# Patient Record
(demographics unavailable — no encounter records)

---

## 2025-02-12 NOTE — PHYSICAL EXAM
[No Acute Distress] : no acute distress [Normal Appearance] : normal appearance [No Neck Mass] : no neck mass [Normal Rate/Rhythm] : normal rate/rhythm [Normal S1, S2] : normal s1, s2 [No Resp Distress] : no resp distress [Clear to Auscultation Bilaterally] : clear to auscultation bilaterally [No Abnormalities] : no abnormalities [No HSM] : no hsm [Normal Gait] : normal gait [No Clubbing] : no clubbing [No Edema] : no edema [Normal Turgor] : normal turgor [No Focal Deficits] : no focal deficits [Oriented x3] : oriented x3 [Normal Affect] : normal affect [Benign] : benign

## 2025-02-12 NOTE — REVIEW OF SYSTEMS
[Diabetes] : diabetes [Fever] : no fever [Fatigue] : no fatigue [Cough] : no cough [Hemoptysis] : no hemoptysis [Sputum] : no sputum [Dyspnea] : no dyspnea [Chest Discomfort] : no chest discomfort [Edema] : no edema [Palpitations] : no palpitations [Nasal Discharge] : no nasal discharge [GERD] : no gerd [Back Pain] : no back pain [Rash] : no rash [Anemia] : no anemia [Anxiety] : no anxiety [Thyroid Problem] : no thyroid problem [Obesity] : no obesity

## 2025-02-12 NOTE — PROCEDURE
[FreeTextEntry1] : PFT 2/12/2025: Moderate obstruction.  Moderate restriction.  Moderate reduction in diffusion.  Significant movement noted after inhalation of bronchodilator.  CT chest 12/23/2024: Punctate pulmonary nodules, favoring benign process.  The largest nodule measures 5 mm in the left lower lobe.  Borderline dilated main pulmonary artery.  No pleural effusion.  No evident adenopathy.  2.2 cm left adrenal nodule.

## 2025-02-12 NOTE — DISCUSSION/SUMMARY
[FreeTextEntry1] : Impression Moderate obstructive lung disease -Noted on PFT -Relatively asymptomatic Pulmonary nodules -Small in size as noted on CT of the chest and PET/CT History of prostate cancer Dilated main pulmonary artery History of prostate cancer History of diabetes  Recommend Echocardiogram if  one has not already been done Follow up CT in June I will see him in [place]

## 2025-02-12 NOTE — HISTORY OF PRESENT ILLNESS
[Never] : never [Snoring] : snoring [TextBox_4] : He is a 69-year-old man who was referred for abnormal CT findings.  He has a history of prostate cancer for which she received radiation therapy which was completed in November 2023.  He had COVID on 1 occasion which did not require hospitalization.  He denied any history of pulmonary disease.  No complaint of cough, wheezing or shortness of breath.  He never smoked.  He has been noted to snore but denies any daytime sleepiness.  [Daytime Somnolence] : denies daytime somnolence

## 2025-06-25 NOTE — HISTORY OF PRESENT ILLNESS
[Never] : never [Snoring] : snoring [TextBox_4] : 2/12/2025: He is a 69-year-old man who was referred for abnormal CT findings.  He has a history of prostate cancer for which he received radiation therapy which was completed in November 2023.  He had COVID on 1 occasion which did not require hospitalization. He denied any history of pulmonary disease.  No complaint of cough, wheezing or shortness of breath.  He never smoked.  6/25/2025: He came for routine scheduled follow-up visit today.  No complaint of cough, wheezing or shortness of breath.  No new medical issues.  He does not smoke. [Daytime Somnolence] : denies daytime somnolence

## 2025-06-25 NOTE — PHYSICAL EXAM
[No Acute Distress] : no acute distress [Normal Appearance] : normal appearance [No Neck Mass] : no neck mass [Normal Rate/Rhythm] : normal rate/rhythm [Normal S1, S2] : normal s1, s2 [No Resp Distress] : no resp distress [Clear to Auscultation Bilaterally] : clear to auscultation bilaterally [No Abnormalities] : no abnormalities [Benign] : benign [No HSM] : no hsm [Normal Gait] : normal gait [No Clubbing] : no clubbing [No Edema] : no edema [Normal Turgor] : normal turgor [No Focal Deficits] : no focal deficits [Oriented x3] : oriented x3 [Normal Affect] : normal affect

## 2025-06-25 NOTE — PROCEDURE
[FreeTextEntry1] : PFT 2/12/2025: Moderate obstruction.  Moderate restriction.  Moderate reduction in diffusion.  Significant movement noted after inhalation of bronchodilator.  PFT 6/25/2025: Moderate obstructive lung disease.  Good response to bronchodilator.  CT chest 12/23/2024: Punctate pulmonary nodules, favoring benign process.  The largest nodule measures 5 mm in the left lower lobe.  Borderline dilated main pulmonary artery.  No pleural effusion.  No evident adenopathy.  2.2 cm left adrenal nodule.

## 2025-06-25 NOTE — DISCUSSION/SUMMARY
[FreeTextEntry1] : Impression Moderate obstructive lung disease -Noted on PFT -Relatively asymptomatic -Declined a trial of bronchodilator Pulmonary nodules -Small in size as noted on CT of the chest and PET/CT History of prostate cancer Dilated main pulmonary artery History of prostate cancer History of diabetes  Recommend Follow-up CT of the chest requested -I will speak to his daughter when the results are available Echocardiogram if one has not already been done -He should check with his PCP to see if this has been done I will see him again in 6 months  Time spent preparing for the visit, interviewing and examining the patient, reviewing data and imaging, discussing the recommendations with the patient and completing documentation was 35 minutes excluding separate billable services.   PFT performed to assist in clinical decision making.